# Patient Record
(demographics unavailable — no encounter records)

---

## 2025-01-09 NOTE — HISTORY OF PRESENT ILLNESS
[Patient reported PAP Smear was normal] : Patient reported PAP Smear was normal [LMP unknown] : LMP unknown [N] : Patient is not sexually active [Y] : Positive pregnancy history [unknown] : Patient is unsure of the date of her LMP [Currently Active] : currently active [Men] : men [Mammogramdate] : 11/14/2024 [TextBox_19] : BR1 [PapSmeardate] : 2021 [BoneDensityDate] : 11/14/2024 [TextBox_37] : Osteopenia [ColonoscopyDate] : 10/24/2023 [TextBox_43] : Polyp, Pt was told to repeat Colonoscopy in 5 years [PGHxTotal] : 3 [[facility]] : 3 [[facility] Center[facility]] : 3 [FreeTextEntry1] : : 3 Hysterectomy: No D&C: Yes Gardasil Vaccine: No

## 2025-01-09 NOTE — PHYSICAL EXAM
[Chaperone Present] : A chaperone was present in the examining room during all aspects of the physical examination [[id number]] : A chaperone was present during the pelvic exam. [Appropriately responsive] : appropriately responsive [Alert] : alert [No Acute Distress] : no acute distress [Soft] : soft [Non-tender] : non-tender [Non-distended] : non-distended [Oriented x3] : oriented x3 [Examination Of The Breasts] : a normal appearance [No Discharge] : no discharge [No Masses] : no breast masses were palpable [Labia Majora] : normal [Labia Minora] : normal [Atrophy] : atrophy [Dry Mucosa] : dry mucosa [No Bleeding] : There was no active vaginal bleeding [Normal] : normal [Uterine Adnexae] : normal

## 2025-01-09 NOTE — PLAN
[FreeTextEntry1] : Up to date with colon cancer screening.   Referral for urogynecology given secondary to urinary incontinence.   Pap done today.  Prescription for a transvaginal sonogram given secondary to a history of a complex ovarian cyst.  Prescription for mammogram screening given. Self-breast exam reviewed.  F/u for pelvic sono or as needed.

## 2025-01-09 NOTE — END OF VISIT
[FreeTextEntry3] : I, Landy Stubbs solely acted as scribe for Dr. Simona Fuchs on 01/09/2025  All medical entries made by the Scribe were at my, Dr. Morin, direction and personally dictated by me on 01/09/2025 . I have reviewed the chart and agree that the record accurately reflects my personal performance of the history, physical exam, assessment and plan. I have also personally directed, reviewed, and agreed with the chart. No...

## 2025-02-22 NOTE — ASSESSMENT
[FreeTextEntry1] :   Nonconcerning physical exam, symptoms seem to be slowly improving Continue conservative management Return if symptoms not improving or worsening. Patient expressed understanding.

## 2025-02-22 NOTE — HISTORY OF PRESENT ILLNESS
[FreeTextEntry8] : 65F here for follow up on Flu. Dx'd 3 days ago at urgent care Flu +. Since then has had some fevers 100.5-100.6 and general fatigue.  She's been taking tylenol and NSAIDs q6 hours alternating for fevers, has needed to take less over the last 1-2 days.  She is walking every day in the backyard for 15 minutes.  No shortness of breath or chest pain.